# Patient Record
Sex: MALE | Race: BLACK OR AFRICAN AMERICAN | Employment: OTHER | URBAN - METROPOLITAN AREA
[De-identification: names, ages, dates, MRNs, and addresses within clinical notes are randomized per-mention and may not be internally consistent; named-entity substitution may affect disease eponyms.]

---

## 2017-11-06 ENCOUNTER — DOCUMENTATION ONLY (OUTPATIENT)
Dept: UROLOGY | Age: 72
End: 2017-11-06

## 2017-11-06 RX ORDER — BICALUTAMIDE 50 MG/1
50 TABLET, FILM COATED ORAL DAILY
Qty: 90 TAB | Refills: 3 | Status: SHIPPED | OUTPATIENT
Start: 2017-11-06

## 2021-07-20 ENCOUNTER — TRANSCRIBE ORDER (OUTPATIENT)
Dept: SCHEDULING | Age: 76
End: 2021-07-20

## 2021-07-20 DIAGNOSIS — I63.033 CEREBRAL INFARCTION DUE TO BILATERAL THROMBOSIS OF CAROTID ARTERIES (HCC): Primary | ICD-10-CM

## 2021-08-06 ENCOUNTER — HOSPITAL ENCOUNTER (OUTPATIENT)
Dept: MRI IMAGING | Age: 76
Discharge: HOME | End: 2021-08-06
Payer: MEDICARE

## 2021-08-06 DIAGNOSIS — I63.033 CEREBRAL INFARCTION DUE TO BILATERAL THROMBOSIS OF CAROTID ARTERIES (HCC): ICD-10-CM

## 2021-08-06 PROCEDURE — 70551 MRI BRAIN STEM W/O DYE: CPT

## 2021-08-20 ENCOUNTER — APPOINTMENT (OUTPATIENT)
Dept: PHYSICAL THERAPY | Age: 76
End: 2021-08-20

## 2021-10-25 ENCOUNTER — TRANSCRIBE ORDER (OUTPATIENT)
Dept: SCHEDULING | Age: 76
End: 2021-10-25

## 2021-10-25 DIAGNOSIS — M54.12 RADICULOPATHY, CERVICAL: Primary | ICD-10-CM

## 2021-11-16 ENCOUNTER — HOSPITAL ENCOUNTER (OUTPATIENT)
Dept: MRI IMAGING | Age: 76
Discharge: HOME | End: 2021-11-16
Payer: MEDICARE

## 2021-11-16 DIAGNOSIS — M54.12 RADICULOPATHY, CERVICAL: ICD-10-CM

## 2021-11-16 PROCEDURE — 72141 MRI NECK SPINE W/O DYE: CPT

## 2022-07-31 ENCOUNTER — HOSPITAL ENCOUNTER (EMERGENCY)
Age: 77
Discharge: HOME | End: 2022-07-31
Attending: EMERGENCY MEDICINE
Payer: MEDICARE

## 2022-07-31 VITALS
SYSTOLIC BLOOD PRESSURE: 143 MMHG | OXYGEN SATURATION: 96 % | RESPIRATION RATE: 18 BRPM | HEIGHT: 71 IN | DIASTOLIC BLOOD PRESSURE: 86 MMHG | WEIGHT: 164 LBS | BODY MASS INDEX: 22.96 KG/M2 | HEART RATE: 67 BPM | TEMPERATURE: 98.4 F

## 2022-07-31 DIAGNOSIS — R31.9 HEMATURIA, UNSPECIFIED TYPE: ICD-10-CM

## 2022-07-31 DIAGNOSIS — N13.8 HYPERTROPHY OF PROSTATE WITH URINARY OBSTRUCTION AND OTHER LOWER URINARY TRACT SYMPTOMS (LUTS): ICD-10-CM

## 2022-07-31 DIAGNOSIS — R33.9 URINARY RETENTION: Primary | ICD-10-CM

## 2022-07-31 DIAGNOSIS — N40.1 HYPERTROPHY OF PROSTATE WITH URINARY OBSTRUCTION AND OTHER LOWER URINARY TRACT SYMPTOMS (LUTS): ICD-10-CM

## 2022-07-31 LAB
APPEARANCE UR: ABNORMAL
BACTERIA URNS QL MICRO: NEGATIVE /HPF
BILIRUB UR QL: NEGATIVE
COLOR UR: ABNORMAL
EPITH CASTS URNS QL MICRO: NORMAL /LPF (ref 0–5)
GLUCOSE UR STRIP.AUTO-MCNC: NEGATIVE MG/DL
HGB UR QL STRIP: ABNORMAL
KETONES UR QL STRIP.AUTO: NEGATIVE MG/DL
LEUKOCYTE ESTERASE UR QL STRIP.AUTO: NEGATIVE
NITRITE UR QL STRIP.AUTO: NEGATIVE
PH UR STRIP: 7 [PH] (ref 5–8)
PROT UR STRIP-MCNC: >300 MG/DL
RBC #/AREA URNS HPF: NORMAL /HPF (ref 0–5)
SP GR UR REFRACTOMETRY: 1.02 (ref 1–1.03)
UROBILINOGEN UR QL STRIP.AUTO: 0.2 EU/DL (ref 0.2–1)
WBC URNS QL MICRO: NORMAL /HPF (ref 0–4)

## 2022-07-31 PROCEDURE — 99283 EMERGENCY DEPT VISIT LOW MDM: CPT

## 2022-07-31 PROCEDURE — 51798 US URINE CAPACITY MEASURE: CPT

## 2022-07-31 PROCEDURE — 87077 CULTURE AEROBIC IDENTIFY: CPT

## 2022-07-31 PROCEDURE — 51702 INSERT TEMP BLADDER CATH: CPT

## 2022-07-31 PROCEDURE — 87186 SC STD MICRODIL/AGAR DIL: CPT

## 2022-07-31 PROCEDURE — 87086 URINE CULTURE/COLONY COUNT: CPT

## 2022-07-31 PROCEDURE — 81001 URINALYSIS AUTO W/SCOPE: CPT

## 2022-07-31 RX ORDER — TAMSULOSIN HYDROCHLORIDE 0.4 MG/1
0.4 CAPSULE ORAL DAILY
Qty: 14 CAPSULE | Refills: 0 | Status: SHIPPED | OUTPATIENT
Start: 2022-07-31

## 2022-07-31 RX ORDER — PROPRANOLOL HYDROCHLORIDE 80 MG/1
80 CAPSULE, EXTENDED RELEASE ORAL DAILY
COMMUNITY

## 2022-07-31 RX ORDER — TOPIRAMATE 50 MG/1
50 TABLET, FILM COATED ORAL 2 TIMES DAILY
COMMUNITY

## 2022-07-31 RX ORDER — CEPHALEXIN 500 MG/1
500 CAPSULE ORAL 3 TIMES DAILY
Qty: 21 CAPSULE | Refills: 0 | Status: SHIPPED | OUTPATIENT
Start: 2022-07-31 | End: 2022-08-07

## 2022-07-31 NOTE — DISCHARGE INSTRUCTIONS
We have sent a culture off on your urine and we will start you on an antibiotic and restart your Flomax. Make sure to follow closely with your primary doctor as well as reestablish care with your urologist.  The catheter will typically come out within a week if you are feeling better. Please return if you are at all worsened or concerned.

## 2022-07-31 NOTE — ED NOTES
Assumed care of patient, A&Ox4, Resp even and unlabored, NAD noted or indicated. Pt presents with complaints of unable to empty bladder and blood in urine since last night, denies nausea, vomiting, SOB, and chest pain. Pt stated that Marleni Finley has a history of prostate problems\". Pt appears well. At baseline and self sufficient. Pt is able to ambulate with a steady gait. Pt MD EKATERINA at bedside. This RN to continue to monitor and maintain safety.     Past Medical History:   Diagnosis Date    Arthritis     herniated disc    BPH (benign prostatic hypertrophy)     Burning with urination     enlarged prostate    Chronic obstructive pulmonary disease (HCC)     emphysema    GERD (gastroesophageal reflux disease)     Hypercholesterolemia     Pneumonia        Past Surgical History:   Procedure Laterality Date    COLONOSCOPY N/A 8/26/2016    COLONOSCOPY with polyp bx performed by Eugene Valle MD at [facility] HX COLONOSCOPY      2015    HX OTHER SURGICAL      colonoscopy    HX UROLOGICAL      turp

## 2022-07-31 NOTE — ED NOTES
Patient discharged at this time. This RN reviewed discharge instructions at this time with the patient. patient and spouse verbalized understanding and does not have any questions. Pt ambulatory upon discharge, & in stable condition. Pt armband removed & shredded.

## 2022-07-31 NOTE — ED TRIAGE NOTES
Patient states 'he has been having blood in his urine and he has been having problems urinating as well since last night. \"

## 2022-07-31 NOTE — ED PROVIDER NOTES
EMERGENCY DEPARTMENT HISTORY AND PHYSICAL EXAM    8:17 AM      Date: 7/31/2022  Patient Name: Sherley Garcia [facility]    History of Presenting Illness     Chief Complaint   Patient presents with    Blood in Urine    Urinary Retention         History Provided By: Patient  Location/Duration/Severity/Modifying factors   Patient is a 74-year-old male with a history of BPH and transurethral surgery in the past, COPD, GERD, hypercholesterolemia, who presents emergency department complaint of dysuria, hematuria, and now difficulty urinating and says he can only urinate small amounts and has the urge to urinate frequently. Patient denies any fevers, chills, flank pain, or shortness of breath. The patient said he had this problem possibly 10 years ago had TURP surgery and that helped the problem has not had an issue in the last 10 years. The patient also says he no longer needs to take his prostate medications because he has been doing well. Patient denies being a smoker, drinker, nor drug user. The patient works at a auto parts store. PCP: Imelda Tavera, DO    Current Outpatient Medications   Medication Sig Dispense Refill    propranolol LA (INDERAL LA) 80 mg SR capsule Take 80 mg by mouth in the morning. atorvastatin (LIPITOR) 20 mg tablet 20 mg nightly. topiramate (TOPAMAX) 50 mg tablet Take 50 mg by mouth two (2) times a day. bicalutamide (CASODEX) 50 mg tablet Take 1 Tab by mouth daily. (Patient not taking: Reported on 7/31/2022) 90 Tab 3    fluticasone (FLONASE) 50 mcg/actuation nasal spray 2 Sprays by Both Nostrils route daily. ranitidine (ZANTAC) 150 mg tablet Take 150 mg by mouth two (2) times a day. (Patient not taking: Reported on 7/31/2022)      tamsulosin (FLOMAX) 0.4 mg capsule Take 1 capsule by mouth daily.  (Patient not taking: Reported on 7/31/2022) 32 capsule 11       Past History     Past Medical History:  Past Medical History:   Diagnosis Date    Arthritis     herniated disc BPH (benign prostatic hypertrophy)     Burning with urination     enlarged prostate    Chronic obstructive pulmonary disease (HCC)     emphysema    GERD (gastroesophageal reflux disease)     Hypercholesterolemia     Pneumonia        Past Surgical History:  Past Surgical History:   Procedure Laterality Date    COLONOSCOPY N/A 2016    COLONOSCOPY with polyp bx performed by Linette Gann MD at [facility] ENDOSCOPY    HX COLONOSCOPY          HX OTHER SURGICAL      colonoscopy    HX UROLOGICAL      turp       Family History:  Family History   Problem Relation Age of Onset    Cancer Mother     Hypertension Sister     Cancer Maternal Grandmother     Diabetes Neg Hx     Heart Disease Neg Hx     Stroke Neg Hx        Social History:  Social History     Tobacco Use    Smoking status: Former     Types: Cigarettes     Quit date: 2/10/2010     Years since quittin.4    Smokeless tobacco: Never   Vaping Use    Vaping Use: Never used   Substance Use Topics    Alcohol use: No     Alcohol/week: 0.0 standard drinks    Drug use: No       Allergies:  No Known Allergies      Review of Systems       Review of Systems   Constitutional:  Negative for activity change, fatigue and fever. HENT:  Negative for congestion and rhinorrhea. Eyes:  Negative for visual disturbance. Respiratory:  Negative for shortness of breath. Cardiovascular:  Negative for chest pain and palpitations. Gastrointestinal:  Negative for abdominal pain, diarrhea, nausea and vomiting. Genitourinary:  Positive for difficulty urinating, hematuria and urgency. Negative for dysuria. Musculoskeletal:  Negative for back pain. Skin:  Negative for rash. Neurological:  Negative for dizziness, weakness and light-headedness. All other systems reviewed and are negative.       Physical Exam   Visit Vitals  BP (!) 143/86 (BP 1 Location: Left upper arm, BP Patient Position: At rest;Sitting)   Pulse 67   Temp 98.4 °F (36.9 °C)   Resp 18   Ht 5' 11\" (1.803 m)   Wt 74.4 kg (164 lb)   SpO2 96%   BMI 22.87 kg/m²         Physical Exam  Vitals and nursing note reviewed. Constitutional:       General: He is not in acute distress. Appearance: He is well-developed. HENT:      Head: Normocephalic and atraumatic. Right Ear: External ear normal.      Left Ear: External ear normal.      Nose: Nose normal.   Eyes:      General: No scleral icterus. Conjunctiva/sclera: Conjunctivae normal.      Pupils: Pupils are equal, round, and reactive to light. Neck:      Thyroid: No thyromegaly. Vascular: No JVD. Trachea: No tracheal deviation. Cardiovascular:      Rate and Rhythm: Normal rate and regular rhythm. Heart sounds: Normal heart sounds. No murmur heard. No friction rub. No gallop. Pulmonary:      Effort: Pulmonary effort is normal.      Breath sounds: Normal breath sounds. Chest:      Chest wall: No tenderness. Abdominal:      General: Bowel sounds are normal. There is no distension. Palpations: Abdomen is soft. Tenderness: There is no abdominal tenderness. There is no guarding or rebound. Comments: Mild suprapubic tenderness, no fullness   Musculoskeletal:         General: No tenderness. Normal range of motion. Cervical back: Normal range of motion and neck supple. Lymphadenopathy:      Cervical: No cervical adenopathy. Skin:     General: Skin is warm and dry. Neurological:      Mental Status: He is alert and oriented to person, place, and time. Cranial Nerves: No cranial nerve deficit. Coordination: Coordination normal.      Comments: No sensory loss, Gait normal, Motor 5/5   Psychiatric:         Behavior: Behavior normal.         Thought Content: Thought content normal.         Judgment: Judgment normal.         Diagnostic Study Results     Labs -  No results found for this or any previous visit (from the past 12 hour(s)).     Radiologic Studies -   No orders to display         Medical Decision Making   I am the first provider for this patient. I reviewed the vital signs, available nursing notes, past medical history, past surgical history, family history and social history. Vital Signs-Reviewed the patient's vital signs. Records Reviewed: Nursing Notes, Old Medical Records, Previous Radiology Studies, and Previous Laboratory Studies (Time of Review: 8:17 AM)    ED Course: Progress Notes, Reevaluation, and Consults: The patient has no bacteria in his urine and has mostly hematuria. We will place a Almendarez catheter as a suspect you have a difficult time voiding especially with the hematuria, start Flomax, send urine culture, and plan for close outpatient care with urology. Workup and recommendations were reviewed with the patient and all questions were answered. The patient understands the plan and will proceed with close outpatient care. I have encouraged the patient to return if at all worsened or concerned. Joo Childress DO 8:45 AM      Provider Notes (Medical Decision Making):   MDM  Number of Diagnoses or Management Options  BPH w urinary obs/LUTS  Hematuria, unspecified type  Urinary retention  Diagnosis management comments: Patient is a 59-year-old male with a history of BPH with history of TURP, COPD, GERD, hypercholesterolemia, the presents emergency department complaint of dysuria, hematuria, and now difficulty urinating with a postvoid residual of 425 mL. The patient is in no distress and will follow patient's urinalysis and if there is evidence of infection may consider putting patient on antibiotic as well as back on his Flomax since he if he like to try that prior to having a Almendarez placed. There is no evidence of infection will encourage the Almendarez, restart Flomax, and have the patient follow closely with urology. Joo Childress DO 8:20 AM          Procedures        Diagnosis     Clinical Impression:   1. Urinary retention    2. Hematuria, unspecified type    3.  BPH w urinary obs/LUTS        Disposition: DC    Follow-up Information    None          Patient's Medications   Start Taking    No medications on file   Continue Taking    ATORVASTATIN (LIPITOR) 20 MG TABLET    20 mg nightly. BICALUTAMIDE (CASODEX) 50 MG TABLET    Take 1 Tab by mouth daily. FLUTICASONE (FLONASE) 50 MCG/ACTUATION NASAL SPRAY    2 Sprays by Both Nostrils route daily. PROPRANOLOL LA (INDERAL LA) 80 MG SR CAPSULE    Take 80 mg by mouth in the morning. RANITIDINE (ZANTAC) 150 MG TABLET    Take 150 mg by mouth two (2) times a day. TAMSULOSIN (FLOMAX) 0.4 MG CAPSULE    Take 1 capsule by mouth daily. TOPIRAMATE (TOPAMAX) 50 MG TABLET    Take 50 mg by mouth two (2) times a day. These Medications have changed    No medications on file   Stop Taking    No medications on file     Disclaimer: Sections of this note are dictated using utilizing voice recognition software. Minor typographical errors may be present. If questions arise, please do not hesitate to contact me or call our department.

## 2022-08-03 LAB
BACTERIA SPEC CULT: ABNORMAL
BACTERIA SPEC CULT: ABNORMAL
CC UR VC: ABNORMAL
SERVICE CMNT-IMP: ABNORMAL

## 2022-08-04 NOTE — ED NOTES
Urine culture was reviewed and found to be positive patient was treated with Keflex. Chel Hampton DO 7:25 AM

## 2022-10-07 ENCOUNTER — HOSPITAL ENCOUNTER (OUTPATIENT)
Dept: CT IMAGING | Age: 77
Discharge: HOME | End: 2022-10-07
Attending: PHYSICIAN ASSISTANT
Payer: MEDICARE

## 2022-10-07 DIAGNOSIS — R31.0 GROSS HEMATURIA: ICD-10-CM

## 2022-10-07 LAB — CREAT UR-MCNC: 1 MG/DL (ref 0.6–1.3)

## 2022-10-07 PROCEDURE — 82565 ASSAY OF CREATININE: CPT

## 2022-10-07 PROCEDURE — 74178 CT ABD&PLV WO CNTR FLWD CNTR: CPT

## 2022-10-07 PROCEDURE — 74011000636 HC RX REV CODE- 636: Performed by: PHYSICIAN ASSISTANT

## 2022-10-07 RX ADMIN — IOPAMIDOL 100 ML: 755 INJECTION, SOLUTION INTRAVENOUS at 10:13

## 2022-10-18 NOTE — PROGRESS NOTES
CT Urogram shows no kidney stones, mildly enlarged prostate, emphysema, and hepatic cysts. Continue current medications. Follow up in 6 months any provider.

## 2022-11-04 ENCOUNTER — HOME HEALTH ADMISSION (OUTPATIENT)
Dept: HOME HEALTH SERVICES | Facility: HOME HEALTH | Age: 77
End: 2022-11-04